# Patient Record
Sex: FEMALE | URBAN - METROPOLITAN AREA
[De-identification: names, ages, dates, MRNs, and addresses within clinical notes are randomized per-mention and may not be internally consistent; named-entity substitution may affect disease eponyms.]

---

## 2018-10-18 ENCOUNTER — APPOINTMENT (RX ONLY)
Dept: URBAN - METROPOLITAN AREA OTHER 4 | Facility: OTHER | Age: 38
Setting detail: DERMATOLOGY
End: 2018-10-18

## 2018-10-18 DIAGNOSIS — L29.8 OTHER PRURITUS: ICD-10-CM

## 2018-10-18 DIAGNOSIS — T07XXXA INSECT BITE, NONVENOMOUS, OF OTHER, MULTIPLE, AND UNSPECIFIED SITES, WITHOUT MENTION OF INFECTION: ICD-10-CM

## 2018-10-18 DIAGNOSIS — L29.89 OTHER PRURITUS: ICD-10-CM

## 2018-10-18 PROBLEM — F41.9 ANXIETY DISORDER, UNSPECIFIED: Status: ACTIVE | Noted: 2018-10-18

## 2018-10-18 PROBLEM — S30.860A INSECT BITE (NONVENOMOUS) OF LOWER BACK AND PELVIS, INITIAL ENCOUNTER: Status: ACTIVE | Noted: 2018-10-18

## 2018-10-18 PROBLEM — S30.861A INSECT BITE (NONVENOMOUS) OF ABDOMINAL WALL, INITIAL ENCOUNTER: Status: ACTIVE | Noted: 2018-10-18

## 2018-10-18 PROCEDURE — ? OTHER

## 2018-10-18 PROCEDURE — 99202 OFFICE O/P NEW SF 15 MIN: CPT

## 2018-10-18 PROCEDURE — ? PRESCRIPTION

## 2018-10-18 PROCEDURE — ? COUNSELING

## 2018-10-18 RX ORDER — TRIAMCINOLONE ACETONIDE 1 MG/G
CREAM TOPICAL BID
Qty: 1 | Refills: 2 | Status: ERX | COMMUNITY
Start: 2018-10-18

## 2018-10-18 RX ORDER — DICAPRYLYL CARBONATE/DIMETH
SPRAY, NON-AEROSOL (ML) TOPICAL
Qty: 1 | Refills: 3 | Status: ERX

## 2018-10-18 RX ORDER — HYDROXYZINE HYDROCHLORIDE 10 MG/1
TABLET, FILM COATED ORAL QHS
Qty: 30 | Refills: 3 | Status: ERX | COMMUNITY
Start: 2018-10-18

## 2018-10-18 RX ORDER — DICAPRYLYL CARBONATE/DIMETH
1 SPRAY, NON-AEROSOL (ML) TOPICAL
Qty: 1 | Refills: 3 | Status: ERX | COMMUNITY
Start: 2018-10-18

## 2018-10-18 RX ADMIN — Medication 1: at 18:23

## 2018-10-18 RX ADMIN — HYDROXYZINE HYDROCHLORIDE: 10 TABLET, FILM COATED ORAL at 18:18

## 2018-10-18 RX ADMIN — TRIAMCINOLONE ACETONIDE: 1 CREAM TOPICAL at 18:17

## 2018-10-18 ASSESSMENT — LOCATION DETAILED DESCRIPTION DERM
LOCATION DETAILED: PERIUMBILICAL SKIN
LOCATION DETAILED: LEFT INFERIOR LATERAL MIDBACK
LOCATION DETAILED: LEFT LATERAL ABDOMEN

## 2018-10-18 ASSESSMENT — LOCATION SIMPLE DESCRIPTION DERM
LOCATION SIMPLE: LEFT LOWER BACK
LOCATION SIMPLE: ABDOMEN

## 2018-10-18 ASSESSMENT — LOCATION ZONE DERM: LOCATION ZONE: TRUNK

## 2018-10-18 NOTE — PROCEDURE: OTHER
Note Text (......Xxx Chief Complaint.): This diagnosis correlates with the
Other (Free Text): Advised patient to have home checked for bed bugs,
Detail Level: Simple

## 2024-12-06 ENCOUNTER — LAB OUTSIDE AN ENCOUNTER (OUTPATIENT)
Dept: URBAN - METROPOLITAN AREA CLINIC 23 | Facility: CLINIC | Age: 44
End: 2024-12-06

## 2024-12-06 ENCOUNTER — DASHBOARD ENCOUNTERS (OUTPATIENT)
Age: 44
End: 2024-12-06

## 2024-12-06 ENCOUNTER — OFFICE VISIT (OUTPATIENT)
Dept: URBAN - METROPOLITAN AREA CLINIC 23 | Facility: CLINIC | Age: 44
End: 2024-12-06
Payer: COMMERCIAL

## 2024-12-06 DIAGNOSIS — R74.8 ELEVATED LIVER ENZYMES: ICD-10-CM

## 2024-12-06 DIAGNOSIS — K52.9 ACUTE GASTROENTERITIS: ICD-10-CM

## 2024-12-06 PROBLEM — 707724006: Status: ACTIVE | Noted: 2024-12-06

## 2024-12-06 PROCEDURE — 99204 OFFICE O/P NEW MOD 45 MIN: CPT | Performed by: STUDENT IN AN ORGANIZED HEALTH CARE EDUCATION/TRAINING PROGRAM

## 2024-12-06 NOTE — PREVIOUS WORKUP REVIEWED EXTERNAL MEDICAL RECORD
2024/11/30 ED visit: Normal blood counts, bilirubin 1.5, alk phos 150, , .  Lipase normal.  Ethanol level negative, acetaminophen level negative, hepatitis B&C negative.  Viral panel notable for parainfluenza type IV positive.  CT abdomen pelvis with fluid-filled large bowel compatible with diarrhea, otherwise normal.  No liver or biliary abnormality seen.

## 2024-12-06 NOTE — HPI-TODAY'S VISIT:
- Presenting with elevated liver enzymes and parainfluenza virus infection identified in recent Emergency Department visit - Initial symptoms began on Thanksgiving Day with acute onset following lunch - Experienced nausea, vomiting, and diarrhea for approximately one week - Started improving in last 2 days with residual mild queasiness and incomplete return of appetite - Currently on semaglutide maintenance dose (0.20 mL), noting historically slower recovery from similar illnesses while on this medication - Similar illness one year ago with prolonged recovery compared to family members - Reports previous gallbladder removal 18 months ago with intraoperative observation of normal-appearing liver despite prior imaging suggesting fatty liver - Significant weight loss history: approximately 60-pound reduction around time of gallbladder surgery (from 210 pounds to 140 pounds) - Previous thyroid issues treated with NDT thyroid medication through functional medicine, recently discontinued - Reports symptoms including hair thinning suggestive of thyroid dysfunction - Previously on multiple supplements and herbal medications, all discontinued approximately 6 months ago - Social history: Reports social alcohol consumption, typically 1-2 drinks weekly, increasing to 1-2 drinks daily during business travel - Past medical history includes history of kidney stones (resolved), hysterectomy, previous hypertension, and hypercholesterolemia - Family history: Brother with colitis diagnosis, grandmother with history of colon polyps ( during polyp removal procedure in eighties)

## 2024-12-08 LAB
AG RATIO: 2
ALBUMIN LEVEL: 4.8
ALK PHOS: 115
ALT: 151
ANION GAP: 6
AST: 24
BILIRUBIN TOTAL: 0.7
BUN/CREAT RATIO: 10
BUN: 8
CALCIUM LEVEL: 9.6
CHLORIDE LEVEL: 104
CO2 LEVEL: 27
CREATININE LEVEL: 0.8
GFR 2021: >60
GLUCOSE LEVEL: 85
HBC IGM AB: (no result)
HEP A AB IGM: (no result)
HEP B CORE AB TOTAL: (no result)
HEP B SURF AB: (no result)
HEP B SURF AG: (no result)
HEP C AB: (no result)
IMMUNOGLOBULIN A IGA: (no result)
OSMO (CALC): 271
PERFORMING LAB: (no result)
POTASSIUM LEVEL: 3.7
PROTEIN TOTAL: 7.1
QUEST CODE: (no result)
SODIUM LEVEL: 137
TSH: 1.41

## 2024-12-09 LAB
ANTI-SMOOTH MUSCLE AB: (no result)
MITOCHONDRIAL AB M2: (no result)
PERFORMING LAB: (no result)
TISSUE TRANSGLUT AB IGA: (no result)

## 2024-12-11 LAB
EBV VCA IGM AB: (no result)
ENDOMYSIAL AB: (no result)
GLIADIN DEAMID AB IGA: (no result)
PERFORMING LAB: (no result)

## 2024-12-12 LAB
ANTINUCLEAR AB, HEP-2 SUBSTRATE, S: (no result)
PERFORMING LAB: (no result)

## 2024-12-20 ENCOUNTER — OFFICE VISIT (OUTPATIENT)
Dept: URBAN - METROPOLITAN AREA CLINIC 23 | Facility: CLINIC | Age: 44
End: 2024-12-20